# Patient Record
Sex: FEMALE | Race: WHITE | NOT HISPANIC OR LATINO | URBAN - METROPOLITAN AREA
[De-identification: names, ages, dates, MRNs, and addresses within clinical notes are randomized per-mention and may not be internally consistent; named-entity substitution may affect disease eponyms.]

---

## 2023-12-28 ENCOUNTER — OUTPATIENT (OUTPATIENT)
Dept: OUTPATIENT SERVICES | Facility: HOSPITAL | Age: 33
LOS: 1 days | End: 2023-12-28
Payer: COMMERCIAL

## 2023-12-28 VITALS
HEIGHT: 70 IN | SYSTOLIC BLOOD PRESSURE: 115 MMHG | TEMPERATURE: 98 F | RESPIRATION RATE: 16 BRPM | OXYGEN SATURATION: 97 % | HEART RATE: 91 BPM | WEIGHT: 223.11 LBS | DIASTOLIC BLOOD PRESSURE: 79 MMHG

## 2023-12-28 DIAGNOSIS — Z01.818 ENCOUNTER FOR OTHER PREPROCEDURAL EXAMINATION: ICD-10-CM

## 2023-12-28 DIAGNOSIS — K64.9 UNSPECIFIED HEMORRHOIDS: ICD-10-CM

## 2023-12-28 DIAGNOSIS — K64.5 PERIANAL VENOUS THROMBOSIS: ICD-10-CM

## 2023-12-28 DIAGNOSIS — K64.4 RESIDUAL HEMORRHOIDAL SKIN TAGS: ICD-10-CM

## 2023-12-28 DIAGNOSIS — Z98.891 HISTORY OF UTERINE SCAR FROM PREVIOUS SURGERY: Chronic | ICD-10-CM

## 2023-12-28 DIAGNOSIS — Z90.49 ACQUIRED ABSENCE OF OTHER SPECIFIED PARTS OF DIGESTIVE TRACT: Chronic | ICD-10-CM

## 2023-12-28 LAB
HCT VFR BLD CALC: 40.5 % — SIGNIFICANT CHANGE UP (ref 34.5–45)
HCT VFR BLD CALC: 40.5 % — SIGNIFICANT CHANGE UP (ref 34.5–45)
HGB BLD-MCNC: 13.6 G/DL — SIGNIFICANT CHANGE UP (ref 11.5–15.5)
HGB BLD-MCNC: 13.6 G/DL — SIGNIFICANT CHANGE UP (ref 11.5–15.5)
MCHC RBC-ENTMCNC: 28.5 PG — SIGNIFICANT CHANGE UP (ref 27–34)
MCHC RBC-ENTMCNC: 28.5 PG — SIGNIFICANT CHANGE UP (ref 27–34)
MCHC RBC-ENTMCNC: 33.6 GM/DL — SIGNIFICANT CHANGE UP (ref 32–36)
MCHC RBC-ENTMCNC: 33.6 GM/DL — SIGNIFICANT CHANGE UP (ref 32–36)
MCV RBC AUTO: 84.7 FL — SIGNIFICANT CHANGE UP (ref 80–100)
MCV RBC AUTO: 84.7 FL — SIGNIFICANT CHANGE UP (ref 80–100)
NRBC # BLD: 0 /100 WBCS — SIGNIFICANT CHANGE UP (ref 0–0)
NRBC # BLD: 0 /100 WBCS — SIGNIFICANT CHANGE UP (ref 0–0)
PLATELET # BLD AUTO: 338 K/UL — SIGNIFICANT CHANGE UP (ref 150–400)
PLATELET # BLD AUTO: 338 K/UL — SIGNIFICANT CHANGE UP (ref 150–400)
RBC # BLD: 4.78 M/UL — SIGNIFICANT CHANGE UP (ref 3.8–5.2)
RBC # BLD: 4.78 M/UL — SIGNIFICANT CHANGE UP (ref 3.8–5.2)
RBC # FLD: 12.1 % — SIGNIFICANT CHANGE UP (ref 10.3–14.5)
RBC # FLD: 12.1 % — SIGNIFICANT CHANGE UP (ref 10.3–14.5)
WBC # BLD: 7.91 K/UL — SIGNIFICANT CHANGE UP (ref 3.8–10.5)
WBC # BLD: 7.91 K/UL — SIGNIFICANT CHANGE UP (ref 3.8–10.5)
WBC # FLD AUTO: 7.91 K/UL — SIGNIFICANT CHANGE UP (ref 3.8–10.5)
WBC # FLD AUTO: 7.91 K/UL — SIGNIFICANT CHANGE UP (ref 3.8–10.5)

## 2023-12-28 PROCEDURE — G0463: CPT

## 2023-12-28 PROCEDURE — 85027 COMPLETE CBC AUTOMATED: CPT

## 2023-12-28 RX ORDER — BUTALBITAL/ACETAMINOPHEN 50MG-650MG
2 TABLET ORAL
Refills: 0 | DISCHARGE

## 2023-12-28 RX ORDER — SODIUM CHLORIDE 9 MG/ML
3 INJECTION INTRAMUSCULAR; INTRAVENOUS; SUBCUTANEOUS EVERY 8 HOURS
Refills: 0 | Status: DISCONTINUED | OUTPATIENT
Start: 2024-01-12 | End: 2024-01-27

## 2023-12-28 RX ORDER — SODIUM CHLORIDE 9 MG/ML
1000 INJECTION, SOLUTION INTRAVENOUS
Refills: 0 | Status: DISCONTINUED | OUTPATIENT
Start: 2024-01-12 | End: 2024-01-12

## 2023-12-28 NOTE — H&P PST ADULT - ASSESSMENT
Denies Dentures or loose teeth Airway  Mallampati II  Denies Dentures or loose teeth    Activity Level  Runs 30 min 3X weekly with weights,   DASI 9.89

## 2023-12-28 NOTE — H&P PST ADULT - HISTORY OF PRESENT ILLNESS
Mild case of COVID-19 June 2022. 33 year old female presents for excisional hemorrhoidectomy Pt. reports developing hemorrhoids during her recent pregnancy. Currently breastfeeding 10 month old infant.    Mild case of COVID-19 June 2022.

## 2023-12-28 NOTE — H&P PST ADULT - NSICDXPASTMEDICALHX_GEN_ALL_CORE_FT
PAST MEDICAL HISTORY:  Hypothyroid      PAST MEDICAL HISTORY:  History of migraine headaches     Hypothyroid     Lactating mother

## 2023-12-28 NOTE — H&P PST ADULT - NEUROLOGICAL COMMENTS
Migraine headaches related to stress and caffeine withdrawal Intermittent Migraine headaches related to stress and caffeine withdrawal

## 2023-12-28 NOTE — H&P PST ADULT - PROBLEM SELECTOR PLAN 2
Pt. encouraged to store breast milk prior to procedure and to speak with anesthesia provider day of procedure about resuming breast feeding post op.

## 2023-12-28 NOTE — H&P PST ADULT - NSANTHOSAYNRD_GEN_A_CORE
No. RYLAN screening performed.  STOP BANG Legend: 0-2 = LOW Risk; 3-4 = INTERMEDIATE Risk; 5-8 = HIGH Risk

## 2024-01-12 ENCOUNTER — OUTPATIENT (OUTPATIENT)
Dept: OUTPATIENT SERVICES | Facility: HOSPITAL | Age: 34
LOS: 1 days | End: 2024-01-12
Payer: COMMERCIAL

## 2024-01-12 VITALS
SYSTOLIC BLOOD PRESSURE: 106 MMHG | OXYGEN SATURATION: 99 % | HEART RATE: 72 BPM | RESPIRATION RATE: 18 BRPM | DIASTOLIC BLOOD PRESSURE: 61 MMHG

## 2024-01-12 VITALS
WEIGHT: 223.11 LBS | HEIGHT: 70 IN | OXYGEN SATURATION: 100 % | SYSTOLIC BLOOD PRESSURE: 100 MMHG | RESPIRATION RATE: 16 BRPM | DIASTOLIC BLOOD PRESSURE: 62 MMHG | HEART RATE: 50 BPM | TEMPERATURE: 98 F

## 2024-01-12 DIAGNOSIS — K64.4 RESIDUAL HEMORRHOIDAL SKIN TAGS: ICD-10-CM

## 2024-01-12 DIAGNOSIS — K64.5 PERIANAL VENOUS THROMBOSIS: ICD-10-CM

## 2024-01-12 DIAGNOSIS — Z98.891 HISTORY OF UTERINE SCAR FROM PREVIOUS SURGERY: Chronic | ICD-10-CM

## 2024-01-12 DIAGNOSIS — Z90.49 ACQUIRED ABSENCE OF OTHER SPECIFIED PARTS OF DIGESTIVE TRACT: Chronic | ICD-10-CM

## 2024-01-12 DIAGNOSIS — K64.9 UNSPECIFIED HEMORRHOIDS: ICD-10-CM

## 2024-01-12 PROCEDURE — 88304 TISSUE EXAM BY PATHOLOGIST: CPT

## 2024-01-12 PROCEDURE — 46260 REMOVE IN/EX HEM GROUPS 2+: CPT

## 2024-01-12 PROCEDURE — 88304 TISSUE EXAM BY PATHOLOGIST: CPT | Mod: 26

## 2024-01-12 RX ORDER — OXYCODONE HYDROCHLORIDE 5 MG/1
5 TABLET ORAL ONCE
Refills: 0 | Status: DISCONTINUED | OUTPATIENT
Start: 2024-01-12 | End: 2024-01-12

## 2024-01-12 RX ORDER — LEVOTHYROXINE SODIUM 125 MCG
1 TABLET ORAL
Refills: 0 | DISCHARGE

## 2024-01-12 RX ORDER — FENTANYL CITRATE 50 UG/ML
25 INJECTION INTRAVENOUS
Refills: 0 | Status: DISCONTINUED | OUTPATIENT
Start: 2024-01-12 | End: 2024-01-12

## 2024-01-12 RX ORDER — LIOTHYRONINE SODIUM 25 UG/1
1 TABLET ORAL
Refills: 0 | DISCHARGE

## 2024-01-12 RX ORDER — BUTALBITAL/ASPIRIN/CAFFEINE 50-325-40
2 TABLET ORAL
Refills: 0 | DISCHARGE

## 2024-01-12 RX ORDER — ONDANSETRON 8 MG/1
4 TABLET, FILM COATED ORAL ONCE
Refills: 0 | Status: DISCONTINUED | OUTPATIENT
Start: 2024-01-12 | End: 2024-01-27

## 2024-01-12 RX ORDER — LIDOCAINE HCL 20 MG/ML
0.2 VIAL (ML) INJECTION ONCE
Refills: 0 | Status: COMPLETED | OUTPATIENT
Start: 2024-01-12 | End: 2024-01-12

## 2024-01-12 RX ADMIN — SODIUM CHLORIDE 3 MILLILITER(S): 9 INJECTION INTRAMUSCULAR; INTRAVENOUS; SUBCUTANEOUS at 13:44

## 2024-01-12 RX ADMIN — SODIUM CHLORIDE 100 MILLILITER(S): 9 INJECTION, SOLUTION INTRAVENOUS at 13:43

## 2024-01-12 NOTE — BRIEF OPERATIVE NOTE - NSICDXBRIEFPROCEDURE_GEN_ALL_CORE_FT
PROCEDURES:  Complex hemorrhoidectomy of internal and external hemorrhoids 12-Jan-2024 16:27:28  Brady Leonard Sub

## 2024-01-12 NOTE — ASU DISCHARGE PLAN (ADULT/PEDIATRIC) - NS MD DC FALL RISK RISK
For information on Fall & Injury Prevention, visit: [email or web address] OR  [email or web address] OR  https://www.cdc.gov/steadi/patient.html For information on Fall & Injury Prevention, visit: [email or web address] OR  [email or web address]-injury OR  https://www.cdc.gov/steadi/patient.html

## 2024-01-12 NOTE — BRIEF OPERATIVE NOTE - NSICDXBRIEFPREOP_GEN_ALL_CORE_FT
PRE-OP DIAGNOSIS:  Hemorrhoids 12-Jan-2024 16:27:36  Brady Leonard Sub   PRE-OP DIAGNOSIS:  Hemorrhoids 12-Jan-2024 16:27:36  Brady Leoanrd Sub

## 2024-01-12 NOTE — ASU DISCHARGE PLAN (ADULT/PEDIATRIC) - CARE PROVIDER_API CALL
Jhonatan Scales  Colon/Rectal Surgery  [address] [place] [address]  Phone: [phone]  Fax: [phone]  Established Patient  Follow Up Time: 1 week   Jhonatan Scales  Colon/Rectal Surgery  [address] [place] [address]  Phone: [phone]  Fax: [phone]  Established Patient  Follow Up Time: 1 week

## 2024-01-12 NOTE — ASU DISCHARGE PLAN (ADULT/PEDIATRIC) - PROCEDURE
Progress Notes by Lillian Clemons MA at 09/19/17 04:47 PM     Author:  Lillian Clemons MA Service:  (none) Author Type:  Medical Assistant     Filed:  09/19/17 04:47 PM Encounter Date:  9/19/2017 Status:  Signed     :  Lillian Clemons MA (Medical Assistant)              We have recommended to patient/parent/guardian that they should wait 15 minutes after receiving an injection.[AZ1.1T] Pt waited recommended time[AZ1.1M]    Electronically Signed by:    Lillian Boudreaux MA , 9/19/2017[AZ1.1T]      Revision History        User Key Date/Time User Provider Type Action    > AZ1.1 09/19/17 04:47 PM Lillian Clemons MA Medical Assistant Sign    M - Manual, T - Template             excisional hemorrhoidectomy

## 2024-01-12 NOTE — ASU PATIENT PROFILE, ADULT - FALL HARM RISK - UNIVERSAL INTERVENTIONS
Bed in lowest position, wheels locked, appropriate side rails in place/Call bell, personal items and telephone in reach/Instruct patient to call for assistance before getting out of bed or chair/Non-slip footwear when patient is out of bed/[place] to call system/Physically safe environment - no spills, clutter or unnecessary equipment/Purposeful Proactive Rounding/Room/bathroom lighting operational, light cord in reach Bed in lowest position, wheels locked, appropriate side rails in place/Call bell, personal items and telephone in reach/Instruct patient to call for assistance before getting out of bed or chair/Non-slip footwear when patient is out of bed/[place] to call system/Physically safe environment - no spills, clutter or unnecessary equipment/Purposeful Proactive Rounding/Room/bathroom lighting operational, light cord in reach

## 2024-01-12 NOTE — ASU DISCHARGE PLAN (ADULT/PEDIATRIC) - ASU DC SPECIAL INSTRUCTIONSFT
Activity as tolerates.  ?  Nothing per rectum.  ?  Regular diet.  ?  May remove dressing today to have bowel movement today or start the local care if pain develops. Otherwise, remove dressing in the AM and start the local care.  ?  Patient already has pre-printed post-care instructions given at the office.  ?  Script for ibuprofen already sent to patient's pharmacy.    Hold butalbital/aspirin/caffeine medication for migraines for 7 days.  ?  Follow-up in 1 week for routine post-op check. Activity as tolerates.    Nothing per rectum.    Regular diet.    May remove dressing today to have bowel movement today or start the local care if pain develops. Otherwise, remove dressing in the AM and start the local care.    Patient already has pre-printed post-care instructions given at the office.    Script for ibuprofen already sent to patient's pharmacy.    Hold butalbital/aspirin/caffeine medication for migraines for 7 days.    Follow-up in 1 week for routine post-op check.

## 2024-01-12 NOTE — BRIEF OPERATIVE NOTE - OPERATION/FINDINGS
Two column hemorrhoidectomy. Right anterior hemorrhoid and left lateral hemorrhoid excised. Hemostasis achieved with electrocautery and monsel solution. Detail Level: Detailed Add 37021 Cpt? (Important Note: In 2017 The Use Of 10905 Is Being Tracked By Cms To Determine Future Global Period Reimbursement For Global Periods): yes

## 2024-01-19 LAB — SURGICAL PATHOLOGY STUDY: SIGNIFICANT CHANGE UP

## 2025-02-14 ENCOUNTER — EMERGENCY (EMERGENCY)
Facility: HOSPITAL | Age: 35
LOS: 1 days | Discharge: HOME | End: 2025-02-14
Attending: EMERGENCY MEDICINE
Payer: COMMERCIAL

## 2025-02-14 VITALS
TEMPERATURE: 99 F | HEIGHT: 70 IN | DIASTOLIC BLOOD PRESSURE: 81 MMHG | HEART RATE: 62 BPM | OXYGEN SATURATION: 100 % | SYSTOLIC BLOOD PRESSURE: 130 MMHG | WEIGHT: 210.1 LBS | RESPIRATION RATE: 16 BRPM

## 2025-02-14 DIAGNOSIS — Z98.891 HISTORY OF UTERINE SCAR FROM PREVIOUS SURGERY: Chronic | ICD-10-CM

## 2025-02-14 DIAGNOSIS — Z90.49 ACQUIRED ABSENCE OF OTHER SPECIFIED PARTS OF DIGESTIVE TRACT: Chronic | ICD-10-CM

## 2025-02-14 LAB
ALBUMIN SERPL ELPH-MCNC: 4.2 G/DL — SIGNIFICANT CHANGE UP (ref 3.3–5)
ALP SERPL-CCNC: 68 U/L — SIGNIFICANT CHANGE UP (ref 40–120)
ALT FLD-CCNC: 11 U/L — SIGNIFICANT CHANGE UP (ref 10–45)
ANION GAP SERPL CALC-SCNC: 10 MMOL/L — SIGNIFICANT CHANGE UP (ref 5–17)
APPEARANCE UR: CLEAR — SIGNIFICANT CHANGE UP
AST SERPL-CCNC: 15 U/L — SIGNIFICANT CHANGE UP (ref 10–40)
BACTERIA # UR AUTO: NEGATIVE /HPF — SIGNIFICANT CHANGE UP
BASOPHILS # BLD AUTO: 0.05 K/UL — SIGNIFICANT CHANGE UP (ref 0–0.2)
BASOPHILS NFR BLD AUTO: 0.5 % — SIGNIFICANT CHANGE UP (ref 0–2)
BILIRUB SERPL-MCNC: 0.1 MG/DL — LOW (ref 0.2–1.2)
BILIRUB UR-MCNC: NEGATIVE — SIGNIFICANT CHANGE UP
BLD GP AB SCN SERPL QL: NEGATIVE — SIGNIFICANT CHANGE UP
BUN SERPL-MCNC: 11 MG/DL — SIGNIFICANT CHANGE UP (ref 7–23)
CALCIUM SERPL-MCNC: 9.2 MG/DL — SIGNIFICANT CHANGE UP (ref 8.4–10.5)
CAST: 0 /LPF — SIGNIFICANT CHANGE UP (ref 0–4)
CHLORIDE SERPL-SCNC: 105 MMOL/L — SIGNIFICANT CHANGE UP (ref 96–108)
CO2 SERPL-SCNC: 23 MMOL/L — SIGNIFICANT CHANGE UP (ref 22–31)
COLOR SPEC: YELLOW — SIGNIFICANT CHANGE UP
CREAT SERPL-MCNC: 0.66 MG/DL — SIGNIFICANT CHANGE UP (ref 0.5–1.3)
DIFF PNL FLD: ABNORMAL
EGFR: 118 ML/MIN/1.73M2 — SIGNIFICANT CHANGE UP
EOSINOPHIL # BLD AUTO: 0.1 K/UL — SIGNIFICANT CHANGE UP (ref 0–0.5)
EOSINOPHIL NFR BLD AUTO: 0.9 % — SIGNIFICANT CHANGE UP (ref 0–6)
GLUCOSE SERPL-MCNC: 107 MG/DL — HIGH (ref 70–99)
GLUCOSE UR QL: NEGATIVE MG/DL — SIGNIFICANT CHANGE UP
HCG SERPL-ACNC: 6061 MIU/ML — HIGH
HCT VFR BLD CALC: 40.5 % — SIGNIFICANT CHANGE UP (ref 34.5–45)
HGB BLD-MCNC: 13.4 G/DL — SIGNIFICANT CHANGE UP (ref 11.5–15.5)
IMM GRANULOCYTES NFR BLD AUTO: 0.2 % — SIGNIFICANT CHANGE UP (ref 0–0.9)
KETONES UR-MCNC: NEGATIVE MG/DL — SIGNIFICANT CHANGE UP
LEUKOCYTE ESTERASE UR-ACNC: NEGATIVE — SIGNIFICANT CHANGE UP
LYMPHOCYTES # BLD AUTO: 3.74 K/UL — HIGH (ref 1–3.3)
LYMPHOCYTES # BLD AUTO: 34 % — SIGNIFICANT CHANGE UP (ref 13–44)
MAGNESIUM SERPL-MCNC: 2.2 MG/DL — SIGNIFICANT CHANGE UP (ref 1.6–2.6)
MCHC RBC-ENTMCNC: 29.1 PG — SIGNIFICANT CHANGE UP (ref 27–34)
MCHC RBC-ENTMCNC: 33.1 G/DL — SIGNIFICANT CHANGE UP (ref 32–36)
MCV RBC AUTO: 87.9 FL — SIGNIFICANT CHANGE UP (ref 80–100)
MONOCYTES # BLD AUTO: 0.69 K/UL — SIGNIFICANT CHANGE UP (ref 0–0.9)
MONOCYTES NFR BLD AUTO: 6.3 % — SIGNIFICANT CHANGE UP (ref 2–14)
NEUTROPHILS # BLD AUTO: 6.39 K/UL — SIGNIFICANT CHANGE UP (ref 1.8–7.4)
NEUTROPHILS NFR BLD AUTO: 58.1 % — SIGNIFICANT CHANGE UP (ref 43–77)
NITRITE UR-MCNC: NEGATIVE — SIGNIFICANT CHANGE UP
NRBC BLD AUTO-RTO: 0 /100 WBCS — SIGNIFICANT CHANGE UP (ref 0–0)
PH UR: 5.5 — SIGNIFICANT CHANGE UP (ref 5–8)
PHOSPHATE SERPL-MCNC: 2.6 MG/DL — SIGNIFICANT CHANGE UP (ref 2.5–4.5)
PLATELET # BLD AUTO: 361 K/UL — SIGNIFICANT CHANGE UP (ref 150–400)
POTASSIUM SERPL-MCNC: 3.6 MMOL/L — SIGNIFICANT CHANGE UP (ref 3.5–5.3)
POTASSIUM SERPL-SCNC: 3.6 MMOL/L — SIGNIFICANT CHANGE UP (ref 3.5–5.3)
PROT SERPL-MCNC: 7.3 G/DL — SIGNIFICANT CHANGE UP (ref 6–8.3)
PROT UR-MCNC: NEGATIVE MG/DL — SIGNIFICANT CHANGE UP
RBC # BLD: 4.61 M/UL — SIGNIFICANT CHANGE UP (ref 3.8–5.2)
RBC # FLD: 12 % — SIGNIFICANT CHANGE UP (ref 10.3–14.5)
RBC CASTS # UR COMP ASSIST: 7 /HPF — HIGH (ref 0–4)
RH IG SCN BLD-IMP: POSITIVE — SIGNIFICANT CHANGE UP
SODIUM SERPL-SCNC: 138 MMOL/L — SIGNIFICANT CHANGE UP (ref 135–145)
SP GR SPEC: 1.01 — SIGNIFICANT CHANGE UP (ref 1–1.03)
SQUAMOUS # UR AUTO: 2 /HPF — SIGNIFICANT CHANGE UP (ref 0–5)
UROBILINOGEN FLD QL: 0.2 MG/DL — SIGNIFICANT CHANGE UP (ref 0.2–1)
WBC # BLD: 10.99 K/UL — HIGH (ref 3.8–10.5)
WBC # FLD AUTO: 10.99 K/UL — HIGH (ref 3.8–10.5)
WBC UR QL: 0 /HPF — SIGNIFICANT CHANGE UP (ref 0–5)

## 2025-02-14 PROCEDURE — 87086 URINE CULTURE/COLONY COUNT: CPT

## 2025-02-14 PROCEDURE — 36000 PLACE NEEDLE IN VEIN: CPT

## 2025-02-14 PROCEDURE — 93975 VASCULAR STUDY: CPT

## 2025-02-14 PROCEDURE — 99284 EMERGENCY DEPT VISIT MOD MDM: CPT | Mod: 25

## 2025-02-14 PROCEDURE — 99284 EMERGENCY DEPT VISIT MOD MDM: CPT

## 2025-02-14 PROCEDURE — 84100 ASSAY OF PHOSPHORUS: CPT

## 2025-02-14 PROCEDURE — 83735 ASSAY OF MAGNESIUM: CPT

## 2025-02-14 PROCEDURE — 86900 BLOOD TYPING SEROLOGIC ABO: CPT

## 2025-02-14 PROCEDURE — 84702 CHORIONIC GONADOTROPIN TEST: CPT

## 2025-02-14 PROCEDURE — 93975 VASCULAR STUDY: CPT | Mod: 26

## 2025-02-14 PROCEDURE — 86901 BLOOD TYPING SEROLOGIC RH(D): CPT

## 2025-02-14 PROCEDURE — 76817 TRANSVAGINAL US OBSTETRIC: CPT | Mod: 26

## 2025-02-14 PROCEDURE — 76817 TRANSVAGINAL US OBSTETRIC: CPT

## 2025-02-14 PROCEDURE — 80053 COMPREHEN METABOLIC PANEL: CPT

## 2025-02-14 PROCEDURE — 81001 URINALYSIS AUTO W/SCOPE: CPT

## 2025-02-14 PROCEDURE — 86850 RBC ANTIBODY SCREEN: CPT

## 2025-02-14 PROCEDURE — 85025 COMPLETE CBC W/AUTO DIFF WBC: CPT

## 2025-02-14 NOTE — ED PROVIDER NOTE - NSFOLLOWUPINSTRUCTIONS_ED_ALL_ED_FT
You were evaluated in the ED for vaginal bleeding. A transvaginal ultrasound revealed the possibility of an ectopic pregnancy. You were seen by the OBGYN team here. While they are not entirely convinced this is a true ectopic pregnancy, they are considering the possibility of you actively miscarrying the baby.     They ask that you follow-up with your OBGYN in ~2 days for repeat testing.     Please seek medical attention if you have worsening bleeding, fever, loss of consciousness, or worsening abdominal pain.     It was a pleasure caring for you while you were here.

## 2025-02-14 NOTE — ED PROVIDER NOTE - PHYSICAL EXAMINATION
Vital Signs Last 24 Hrs  T(C): 37 (14 Feb 2025 17:51), Max: 37 (14 Feb 2025 17:51)  T(F): 98.6 (14 Feb 2025 17:51), Max: 98.6 (14 Feb 2025 17:51)  HR: 62 (14 Feb 2025 17:51) (62 - 62)  BP: 130/81 (14 Feb 2025 17:51) (130/81 - 130/81)  RR: 16 (14 Feb 2025 17:51) (16 - 16)  SpO2: 100% (14 Feb 2025 17:51) (100% - 100%)    Parameters below as of 14 Feb 2025 17:51  Patient On (Oxygen Delivery Method): room air    CONSTITUTIONAL: NAD; well-developed;   HEENT: PERRL, clear conjunctiva  RESPIRATORY: Normal respiratory effort; lungs are clear to auscultation bilaterally; No Crackles/Rhonchi/Wheezing  CARDIOVASCULAR: Regular rate and rhythm, normal S1 and S2, no murmur/rub/gallop; No lower extremity edema; Peripheral pulses are 2+ bilaterally  ABDOMEN:  Nontender to Palpation; normoactive bowel sounds, no rebound/guarding; No hepatosplenomegaly  MUSCULOSKELETAL: no clubbing or cyanosis of digits; no joint swelling or tenderness to palpation  EXTREMITY: Lower extremities Non-tender to palpation; non-erythematous B/L  NEURO: A&Ox3; no focal deficits   PSYCH: normal mood; Affect appropriate

## 2025-02-14 NOTE — ED ADULT NURSE NOTE - OBJECTIVE STATEMENT
Pt is a 33y/o F hypothyroid presents to [facility] ED from triage for vaginal bleeding. Pt endorsing vaginal bleeding x 3h, has not been soaking through pads, with mild abdominal cramping, pt states she is 6 weeks pregnant. Reports small clots in blood, denies dizziness or urinary symptoms. Upon physical assessment pt skin warm and dry, alert and oriented, airway patent, afebrile. Pt is A&Ox4 currently denying any HA, visual deficits, SOB, cough, CP, palpitations, urinary symptoms, n/v/d/c, fever/chills, weakness/fatigue. Pt ambulates independently at baseline. Bedrail's up and comfort measures provided

## 2025-02-14 NOTE — ED PROVIDER NOTE - OBJECTIVE STATEMENT
Patient is a 35 y/o  F (3 previous abortions) w/ PMH of hypothyroidism p/w 2x hours of vaginal bleeding. Patient currently ~6 weeks pregnant, LMP 2025. Patient reports that vaginal bleeding started 4x hours ago. Reports dark blood w/ "small clots". Notes that she would soak the tissue when wiping. Since, for the past 2 hours, the vaginal bleeding has stopped. Patient repots mild lower abdominal cramping that has been present since (+) pregnancy test 2 weeks ago. Noting some mildly increased cramping when vaginal bleeding occurred; since, returned to baseline. Denies fever, chills, SOB, CP, palpitations, cough, dysuria, diarrhea, constipation, lower ext. pain/swelling. Reports previous birth induced at 41w and delivered by  and healthy daughter. Otherwise, no complications during previous pregnancy to term. Scheduled for first sonogram w/ OBGYN on 2025.

## 2025-02-14 NOTE — ED ADULT TRIAGE NOTE - CHIEF COMPLAINT QUOTE
vaginal bleeding x 3h, has not been soaking through pads, with abdominal cramping  home pregnancy test positive 2 weeks ago

## 2025-02-14 NOTE — ED ADULT NURSE NOTE - NSFALLUNIVINTERV_ED_ALL_ED
Bed/Stretcher in lowest position, wheels locked, appropriate side rails in place/Call bell, personal items and telephone in reach/Instruct patient to call for assistance before getting out of bed/chair/stretcher/Non-slip footwear applied when patient is off stretcher/[place] to call system/Physically safe environment - no spills, clutter or unnecessary equipment/Purposeful proactive rounding/Room/bathroom lighting operational, light cord in reach

## 2025-02-14 NOTE — ED PROVIDER NOTE - PATIENT PORTAL LINK FT
You can access the FollowMyHealth Patient Portal offered by [facility] by registering at the following website: [email or web address]. By joining [facility]’s FollowMyHealth portal, you will also be able to view your health information using other applications (apps) compatible with our system.

## 2025-02-14 NOTE — ED ADULT NURSE NOTE - NSFALLLASTSIX_ED_ALL_ED
Partially impaired: cannot see medication labels or newsprint, but can see obstacles in path, and the surrounding layout; can count fingers at arm's length No.

## 2025-02-14 NOTE — ED PROVIDER NOTE - NS ED ROS FT
General: Denies dizziness, fatigue  Eyes: Denies blurry vision  ENMT: Denies rhinorrhea  Respiratory: Denies cough, SOB  Cardiovascular: Denies palpitations, CP  Gastrointestinal: +abd cramping, (-) N/V/D/C, hematochezia, melena  : Denies dysuria, increased freq  GYN: =Vaginal bleeding since resolved  Musculoskeletal: Denies edema, joint pain  Neuro: Denies weakness, numbness  Psych: Denies anxiety, depression  All ROS negative unless indicated above

## 2025-02-14 NOTE — ED PROVIDER NOTE - CLINICAL SUMMARY MEDICAL DECISION MAKING FREE TEXT BOX
Patient is a 35 y/o  F (3 previous abortions) w/ PMH of hypothyroidism p/w 2x hours of vaginal bleeding. Patient currently ~6 weeks pregnant, LMP 2025. Patient reports that vaginal bleeding started 4x hours ago, since resolved. Ddx includes physiologic vaginal bleeding i/s/o early pregnancy vs threatened . Will order basic labs, T&S, bHCG, TVUS, UA. Patient is a 35 y/o  F (3 previous abortions) w/ PMH of hypothyroidism p/w 2x hours of vaginal bleeding. Patient currently ~6 weeks pregnant, LMP 2025. Patient reports that vaginal bleeding started 4x hours ago, since resolved. Ddx includes physiologic vaginal bleeding i/s/o early pregnancy vs threatened . Will order basic labs, T&S, bHCG, TVUS, UA.        Attending note.  Patient was seen in room #2.  Patient had vaginal spotting with wiping for approximately 2 hours which began 4 hours ago.  She has not had any bleeding in the last 2 hours.  She denies any lower back pain, lightheadedness, dizziness.  Patient has felt lower abdominal cramping since positive home pregnancy 2 weeks ago.  LMP was January 3.  Patient is G5, P1 with 3 prior terminations and the last pregnancy 41-week pregnancy .  Patient has hypothyroidism and has been on a stable dose for many years.  She denies any trauma or injury.  She denies any urinary symptoms.     ROS-as above, otherwise negative.  PE-patient is alert in no acute distress.  There is no pallor or jaundice.  Lungs are clear to the bilateral.  Heart is regular rate and rhythm.  Abdomen soft, nontender nondistended.  There is no CVA tenderness.  Neurologically patient is grossly intact.     A/P-patient with positive home pregnancy with 2 hours of spotting vaginal bleeding which is ceased.  Beta-hCG, type and Rh, UA, labs, transvaginal ultrasound.

## 2025-02-15 VITALS
DIASTOLIC BLOOD PRESSURE: 71 MMHG | HEART RATE: 58 BPM | OXYGEN SATURATION: 97 % | TEMPERATURE: 98 F | SYSTOLIC BLOOD PRESSURE: 122 MMHG | RESPIRATION RATE: 18 BRPM

## 2025-02-15 PROBLEM — E03.9 HYPOTHYROIDISM, UNSPECIFIED: Chronic | Status: ACTIVE | Noted: 2023-12-28

## 2025-02-15 PROBLEM — Z86.69 PERSONAL HISTORY OF OTHER DISEASES OF THE NERVOUS SYSTEM AND SENSE ORGANS: Chronic | Status: ACTIVE | Noted: 2023-12-28

## 2025-02-15 LAB
CULTURE RESULTS: SIGNIFICANT CHANGE UP
SPECIMEN SOURCE: SIGNIFICANT CHANGE UP

## 2025-02-15 NOTE — CONSULT NOTE ADULT - SUBJECTIVE AND OBJECTIVE BOX
IT MANNING  34y  Female [id number]    HPI:  35yo  (LMP: 1/3) @5w5d presents with vaginal spotting x 1 day in setting of known pregnancy. GYN consulted for TVUS concerning for c section scar ectopic. Patient reports mild cramping, denies severe abdominal pain. Denies n/v, fevers, chills, changes in urinary/bowel habits. This is a desired pregnancy.       Name of GYN Physician: [facility]     POB: pLTCS for category 2 FHR, medication TOP x3   Pgyn: Denies fibroids, cysts, STI's, Abnormal pap smears   PMH: denies  PSH: lsc appendectomy   Home meds: PNV         Vital Signs Last 24 Hrs  T(C): 36.9 (15 Feb 2025 02:54), Max: 37 (2025 17:51)  T(F): 98.4 (15 Feb 2025 02:54), Max: 98.6 (2025 17:51)  HR: 58 (15 Feb 2025 02:54) (58 - 64)  BP: 122/71 (15 Feb 2025 02:54) (100/55 - 130/81)  BP(mean): 74 (15 Feb 2025 00:51) (74 - 74)  RR: 18 (15 Feb 2025 02:54) (16 - 18)  SpO2: 97% (15 Feb 2025 02:54) (97% - 100%)    Parameters below as of 15 Feb 2025 02:54  Patient On (Oxygen Delivery Method): room air        Physical Exam:   General: sitting comfortably in bed, NAD   Lungs: Breathing comfortably on room air   Back: No CVA tenderness  Abd: Soft, non-tender, non-distended.   :  Light brown spotting on pad.    External labia wnl.  Bimanual exam with no cervical motion tenderness, uterus wnl, adnexa non palpable b/l.    Speculum Exam: No active bleeding from os.  Physiologic discharge.    Ext: non-tender b/l, no edema     LABS:                            13.4   10.99 )-----------( 361      ( 2025 21:07 )             40.5     02-14    138  |  105  |  11  ----------------------------<  107[H]  3.6   |  23  |  0.66    Ca    9.2      2025 21:07  Phos  2.6       Mg     2.2         TPro  7.3  /  Alb  4.2  /  TBili  0.1[L]  /  DBili  x   /  AST  15  /  ALT  11  /  AlkPhos  68      I&O's Detail      Urinalysis Basic - ( 2025 21:07 )    Color: x / Appearance: x / SG: x / pH: x  Gluc: 107 mg/dL / Ketone: x  / Bili: x / Urobili: x   Blood: x / Protein: x / Nitrite: x   Leuk Esterase: x / RBC: x / WBC x   Sq Epi: x / Non Sq Epi: x / Bacteria: x        RADIOLOGY & ADDITIONAL STUDIES:  TVUS:     ACC: [id number] EXAM:  US DPLX PELVIC   ORDERED BY:  CHRISTIAN LEMOS     ACC: [id number] EXAM:  US OB TRANSVAGINAL   ORDERED BY:  CHRISTIAN LEMOS     PROCEDURE DATE:  2025          INTERPRETATION:  CLINICAL INFORMATION: Vaginal bleeding, 6 weeks   pregnant. History of prior .    LMP: 2025 (accession [id number]), 1/3/2025 (accession [id number])    Estimated Gestational Age by LMP: 6 weeks and 0 days    COMPARISON: None available.    TECHNIQUE: Endovaginal and transabdominal pelvic sonogram. Color and   Spectral Doppler was performed.    FINDINGS:  Uterus: Retroverted uterus measures 8.5 x 5.0 x 6.3 cm.    A single intrauterine gestational sac containing a single yolk sac and   single embryonic pole is seen in the lower uterine segment, along the   anterior wall.    Gestational Sac Size (mean): 9 mm  Gestational Sac Shape : Normal.  Crown Rump Length: 2 mm.  Estimated Gestational Age: 5 weeks and 5 days by crown rump length.  Yolk Sac: Normal  Fetal Heart Rate: N/A    Right ovary: 0.0 cm (accession [id number]), 3.1 (accession [id number]) x 1.0   cm (accession [id number]), 1.0 (accession [id number]) x 1.4 cm. Within normal   limits. Normal arterial and venous waveforms.  Left ovary: 2.2 cm (accession [id number]), 3.4 (accession [id number]) x 2.2 x   3.1 cm. Right adnexal cyst measures 2.4 x 1.3 x 2.2 cm. Corpus luteal   cyst measures 2.1 x 2.0 x 2.3 cm. Normal arterial and venous waveforms.    Fluid: None.    IMPRESSION:  Retroverted uterus contains a single intrauterine gestational sac along   the anterior wall of the lower uterine segment. Provided history states   the patient had prior  and therefore, given the anterior   location of the sac, this is concerning for a  scar ectopic   pregnancy. Given the eccentric location of the sac, impending spontaneous   miscarriage although still a possibility is thought to be less likely.    --- End of Report ---           ROMEO MADRID DO; Resident Radiologist  This document has been electronically signed.  RADAMES DE LA CRUZ MD; Attending Radiologist  This document has been electronically signed. Feb 15 2025 12:56AM

## 2025-02-15 NOTE — CONSULT NOTE ADULT - ATTENDING COMMENTS
Attending Note   Pt reports having spotting when wiping   highly desired pregnancy   benign exam   cervix closed   reviewed images of TVUS, gestational sac with yolk sac in LEWIS, + myometrium around sac, difficult to interpret scan   discussed with pt TVUS findings and possible concern for miscarriage in process vs abnormal IUP vs c/section scar ectopic  pt is motivated to go home with close f/u   precautions reivewed  B+     R Lydia ZHOU

## 2025-02-15 NOTE — CONSULT NOTE ADULT - ASSESSMENT
A/P: 35yo  (LMP: /3) @5w5d presents with vaginal spotting x 1 day in setting of known pregnancy. GYN consulted for TVUS concerning for c section scar ectopic. TVUS showed IUP w/ 9mm GC and 2mm CRL in lower uterine segment. On physical exam, patient with minimal brown spotting and benign abdominal exam. VSS. H/H wnl. [facility]. On further review of TVUS, difficult to assess at this time whether pregnancy is a spontaneous  in progress that has moved from fundus to lower uterine segment vs. viable iup vs. c section scar ectopic without comparison. No acute GYN intervention at this time. Patient hemodynamically stable with benign abdominal and pelvic exam, plan for outpatient follow-up.   - Patient has appointment w/ Dr. Cintia Marti on ; patient counseled on importance of follow-up   - Blood type: B+.  No need for rhogam at this time.   - Return precautions reviewed with patient.   - Patient to be added to GYN b-HCG list for closer follow up.    - Patient stable for d/c home from GYN perspective.  Primary management per ED team.      D/w Dr. Freeman,   [place] Vo, PGY2

## 2025-02-19 NOTE — CHART NOTE - NSCHARTNOTEFT_GEN_A_CORE
Patient called at this time and not available. Voicemail left at this time. Will continue to follow up. Pt to remain on beta list.     Jacqueline Plunkett PGY1
Pt reached out to at this time regarding beta follow up. Pt reports that after her ED visit on 2/14, she followed up with her private OBGYN ([facility]) on 2/17 and was sent to the ED at [facility]. While there, she reports they did "testing" and told her she would need a procedure because it is an C/s scar ectopic. Pt denies abdominal pain, vaginal bleeding, light-headedness, dizziness, SOB at this time. Pt reports she followed up via telehealth this morning with her private OBGYN regarding her procedure on Friday.     Pt to be moved off the beta list at this time given that she has appropriate follow up.     D/w Albertina Azevedo, PGY5  Jacqueline Plunkett PGY1
TITA PGY2 Note    Attempted to call patient to repeat b-hcg today. Patient did not answer. Will attempt to call again.     MARY ALICE Haro, PGY-2

## 2025-03-25 NOTE — ASU PATIENT PROFILE, ADULT - HISTORY OF COVID-19 VACCINATION
Spoke to pharmacy medication is ready for  since 03/10/2025. Spoke to patient informed him of needing an annual and medication is ready.  
Yes

## (undated) DEVICE — PACK MINOR

## (undated) DEVICE — DRSG COMBINE 5X9"

## (undated) DEVICE — DRAPE THYROID 77" X 123"

## (undated) DEVICE — DRAPE INSTRUMENT POUCH 6.75" X 11"

## (undated) DEVICE — SOL IRR POUR NS 0.9% 500ML

## (undated) DEVICE — SPECIMEN CONTAINER 100ML

## (undated) DEVICE — DRSG TELFA 3 X 8

## (undated) DEVICE — POSITIONER FOAM EGG CRATE ULNAR 2PCS (PINK)

## (undated) DEVICE — MEDICATION LABELS W MARKER

## (undated) DEVICE — SOL IRR POUR H2O 250ML

## (undated) DEVICE — GLV 7.5 PROTEXIS (CREAM) NEU-THERA

## (undated) DEVICE — GLV 7 PROTEXIS (WHITE)

## (undated) DEVICE — WARMING BLANKET UPPER ADULT

## (undated) DEVICE — NDL HYPO SAFE 25G X 1.5" (ORANGE)

## (undated) DEVICE — LIGASURE SMALL JAW

## (undated) DEVICE — PREP BETADINE SPONGE STICKS

## (undated) DEVICE — TAPE SILK 3"

## (undated) DEVICE — VENODYNE/SCD SLEEVE CALF MEDIUM